# Patient Record
Sex: FEMALE | URBAN - METROPOLITAN AREA
[De-identification: names, ages, dates, MRNs, and addresses within clinical notes are randomized per-mention and may not be internally consistent; named-entity substitution may affect disease eponyms.]

---

## 2018-10-18 ENCOUNTER — APPOINTMENT (RX ONLY)
Dept: URBAN - METROPOLITAN AREA CLINIC 100 | Facility: CLINIC | Age: 68
Setting detail: DERMATOLOGY
End: 2018-10-18

## 2018-10-18 DIAGNOSIS — L20.89 OTHER ATOPIC DERMATITIS: ICD-10-CM

## 2018-10-18 PROBLEM — L20.84 INTRINSIC (ALLERGIC) ECZEMA: Status: ACTIVE | Noted: 2018-10-18

## 2018-10-18 PROCEDURE — ? PRESCRIPTION

## 2018-10-18 PROCEDURE — ? PATIENT SPECIFIC COUNSELING

## 2018-10-18 PROCEDURE — 99212 OFFICE O/P EST SF 10 MIN: CPT

## 2018-10-18 RX ORDER — CLOBETASOL PROPIONATE 0.5 MG/G
CREAM TOPICAL
Qty: 1 | Refills: 1 | Status: ERX | COMMUNITY
Start: 2018-10-18

## 2018-10-18 RX ADMIN — CLOBETASOL PROPIONATE: 0.5 CREAM TOPICAL at 15:27

## 2018-10-18 NOTE — PROCEDURE: PATIENT SPECIFIC COUNSELING
Detail Level: Zone
Discussed patch testing. Literature given.\\nUse baking soda as a deodorant. \\nWash with vanicream cleansing bar.  Continue vanicream moisturizing cream. \\nUse clobetasol cream bid x 2 weeks. \\nDo not use dryer sheets. \\nUse all free and clear laundry detergent.

## 2018-10-22 ENCOUNTER — RX ONLY (OUTPATIENT)
Age: 68
Setting detail: RX ONLY
End: 2018-10-22

## 2018-10-22 RX ORDER — TRIAMCINOLONE ACETONIDE 1 MG/G
CREAM TOPICAL
Qty: 1 | Refills: 0 | Status: ERX | COMMUNITY
Start: 2018-10-22

## 2018-10-26 ENCOUNTER — APPOINTMENT (RX ONLY)
Dept: URBAN - METROPOLITAN AREA CLINIC 100 | Facility: CLINIC | Age: 68
Setting detail: DERMATOLOGY
End: 2018-10-26

## 2018-10-26 DIAGNOSIS — L20.89 OTHER ATOPIC DERMATITIS: ICD-10-CM

## 2018-10-26 PROBLEM — L20.84 INTRINSIC (ALLERGIC) ECZEMA: Status: ACTIVE | Noted: 2018-10-26

## 2018-10-26 PROCEDURE — ? PATIENT SPECIFIC COUNSELING

## 2018-10-26 PROCEDURE — 99212 OFFICE O/P EST SF 10 MIN: CPT

## 2018-10-26 ASSESSMENT — LOCATION ZONE DERM: LOCATION ZONE: TRUNK

## 2018-10-26 ASSESSMENT — LOCATION SIMPLE DESCRIPTION DERM: LOCATION SIMPLE: RIGHT UPPER BACK

## 2018-10-26 ASSESSMENT — LOCATION DETAILED DESCRIPTION DERM: LOCATION DETAILED: RIGHT SUPERIOR UPPER BACK

## 2018-10-26 NOTE — PROCEDURE: PATIENT SPECIFIC COUNSELING
Patient declined having patch test at this time. She stated that she is no longer having the issue and that she thinks baking soda was causing the issue. I called Dr. Callaway and explained the situation and he stated that it was up to her and that we could always do the patch testing if reaction occurs again.
Detail Level: Zone

## 2018-11-16 ENCOUNTER — APPOINTMENT (RX ONLY)
Dept: URBAN - METROPOLITAN AREA CLINIC 100 | Facility: CLINIC | Age: 68
Setting detail: DERMATOLOGY
End: 2018-11-16

## 2018-11-16 DIAGNOSIS — L20.89 OTHER ATOPIC DERMATITIS: ICD-10-CM

## 2019-01-04 ENCOUNTER — APPOINTMENT (RX ONLY)
Dept: URBAN - METROPOLITAN AREA CLINIC 100 | Facility: CLINIC | Age: 69
Setting detail: DERMATOLOGY
End: 2019-01-04

## 2019-01-04 DIAGNOSIS — L20.89 OTHER ATOPIC DERMATITIS: ICD-10-CM

## 2019-01-04 PROBLEM — L20.84 INTRINSIC (ALLERGIC) ECZEMA: Status: ACTIVE | Noted: 2019-01-04

## 2019-01-04 PROCEDURE — 95044 PATCH/APPLICATION TESTS: CPT

## 2019-01-04 PROCEDURE — ? PATCH TESTING

## 2019-01-04 ASSESSMENT — LOCATION SIMPLE DESCRIPTION DERM: LOCATION SIMPLE: RIGHT UPPER BACK

## 2019-01-04 ASSESSMENT — LOCATION DETAILED DESCRIPTION DERM: LOCATION DETAILED: RIGHT MID-UPPER BACK

## 2019-01-04 ASSESSMENT — LOCATION ZONE DERM: LOCATION ZONE: TRUNK

## 2019-01-04 NOTE — PROCEDURE: PATCH TESTING
Number Of Patches (Maximum Allowable Per Dos By Cms Is 90): 80
Consent: Written consent obtained, risks reviewed including but not limited to rash, itching, allergic reaction, systemic rash, remote possiblity of anaphylaxis to allergen.
Post-Care Instructions: I reviewed with the patient in detail post-care instructions. Patient should not sweat, pick at, or get the patches wet for 48 hours.
Detail Level: None

## 2019-01-07 ENCOUNTER — APPOINTMENT (RX ONLY)
Dept: URBAN - METROPOLITAN AREA CLINIC 100 | Facility: CLINIC | Age: 69
Setting detail: DERMATOLOGY
End: 2019-01-07

## 2019-01-07 DIAGNOSIS — L20.89 OTHER ATOPIC DERMATITIS: ICD-10-CM

## 2019-01-07 PROBLEM — L20.84 INTRINSIC (ALLERGIC) ECZEMA: Status: ACTIVE | Noted: 2019-01-07

## 2019-01-07 PROCEDURE — ? CORE ACDS PATCH TEST READING

## 2019-01-07 PROCEDURE — 99212 OFFICE O/P EST SF 10 MIN: CPT

## 2019-01-07 ASSESSMENT — LOCATION SIMPLE DESCRIPTION DERM: LOCATION SIMPLE: UPPER BACK

## 2019-01-07 ASSESSMENT — LOCATION DETAILED DESCRIPTION DERM: LOCATION DETAILED: INFERIOR THORACIC SPINE

## 2019-01-07 ASSESSMENT — LOCATION ZONE DERM: LOCATION ZONE: TRUNK

## 2019-01-07 NOTE — PROCEDURE: CORE ACDS PATCH TEST READING
Allergen 63 Reaction: no reaction
Name Of Allergen 6: Fragrance Mix
Name Of Allergen 20: p-phenylenediamine
Name Of Allergen 39: Polymyxin b sulfate
Name Of Allergen 64: [place] odorate
Name Of Allergen 50: Ethyl acrylate
Show Negative Results In The Note?: Yes
Name Of Allergen 32: 2-mercaptobenzothiazole
Name Of Allergen 71: Triamcinolone ace tonite
Name Of Allergen 11: Ethylenediamine Dinydrochloride
Name Of Allergen 25: Diazolidinyl urea
Name Of Allergen 44: Oleamidopropyl dimethylamine
Name Of Allergen 78: Butylhydrotoluene
Name Of Allergen 59: 4-chloro-3-cresol
Name Of Allergen 55: 2-hydroxy-4-methoxybenzophenone
Allergen 1 Counseling: Patch test negative. \\nPatient was advised to call our office if she develops any delayed reactions up to 72 hours.
Name Of Allergen 4: Potassium Dichromate
Name Of Allergen 18: quaternium 15
Name Of Allergen 37: Propylene glycol
Name Of Allergen 66: ethyleneurea, melamine formaldehyde mix
Name Of Allergen 48: Cinnamic aldehyde
Name Of Allergen 16: Black rubber Mix
Name Of Allergen 3: Neomycin
Name Of Allergen 36: Lidocaine HCL
Name Of Allergen 67: Sorbitan sesquioleate
Name Of Allergen 47: Lavandula augustifolia oil (lavender oil)
Name Of Allergen 15: Carba Mix
Name Of Allergen 74: Ethyl cyanoacrylate
Name Of Allergen 29: Imidazolidinyl Urea
Name Of Allergen 8: Paraben Mix (B)
Name Of Allergen 22: Mercapto Mix (A)
Name Of Allergen 41: Mixed dialkyl thioureas
Name Of Allergen 62: Sodium benzoate
Name Of Allergen 52: Chlorhexidine Digluconte
Name Of Allergen 69: Cetylstearylalcohol
Name Of Allergen 13: p-test-butylphenol formaldehyde resin
Name Of Allergen 27: Tixocortol-21-pivalate
Name Of Allergen 57: Sesquiterpenelactone Mix
Name Of Allergen 76: Disperse orange-3
Allergen 6 Reaction: 2+
Name Of Allergen 33: Bacitracin
Name Of Allergen 70: Ethyl hexyl glycerol
Name Of Allergen 12: Cobalt (II) chloride hexahydrate
Number Of Patches Read: 80
Name Of Allergen 26: Benzocaine
Name Of Allergen 45: Decal glycoside
Name Of Allergen 77: Benzoic acid
Name Of Allergen 58: coconut diethanolamide
Name Of Allergen 56: Tosylamide formaldehyde resin
Name Of Allergen 5: DMDM Hydration
Name Of Allergen 19: methyldibromo glutaronitrile
Name Of Allergen 38: Iodopropynyl butylcarbamate
Name Of Allergen 65: Compositae mix
Name Of Allergen 49: Di alpha tocopherol
Name Of Allergen 17: methylchloroisothiazolinone/methylisothiazolinone
Name Of Allergen 31: hydrocortisone-17-butyrate
Name Of Allergen 72: Clobetasol-17-propionate
Name Of Allergen 10: balsam of [place]
Name Of Allergen 1: Nickel sulfate
Name Of Allergen 24: Thiuram Mix (A)
Name Of Allergen 43: 2-hydroxyethyl-methacrylate
Name Of Allergen 60: Benzalkonium chloride
Name Of Allergen 79: 2-ethylhexyl-4-methoxycinnamate
Name Of Allergen 54: 4-Chloro-3,5-xylenol
Name Of Allergen 30: Budesonide
Name Of Allergen 73: Amidoamine
Name Of Allergen 9: Methylisothiazolinone
Name Of Allergen 2: Amerchol L101
Name Of Allergen 23: 2-broom-2-nitropropane-1,3-Dial
Name Of Allergen 42: Dimethylaminopropylamine
Name Of Allergen 61: Benzophenone-4
Name Of Allergen 80: Benzyl alcohol
Name Of Allergen 53: Propolis
Name Of Allergen 35: Disperse blue mix
Name Of Allergen 68: 1,3-diphenylguanidine
Name Of Allergen 46: Methyl methacrylate
Name Of Allergen 14: bisphenol A epoxy resin
Allergen 34 Reaction: 1+
Name Of Allergen 28: Gold sodium thiosulfate
Name Of Allergen 75: Phenoxyethanol
Name Of Allergen 7: Colophony
Detail Level: Zone
Name Of Allergen 21: Formaldehyde
Name Of Allergen 40: Cocamidopropyl betaine
Name Of Allergen 63: Sorbic acid
Name Of Allergen 51: Tea Tree Oil
What Reading Time Point?: 72 hour

## 2022-08-12 NOTE — HPI: RASH
What Type Of Note Output Would You Prefer (Optional)?: Standard Output
How Severe Is Your Rash?: mild
Is This A New Presentation, Or A Follow-Up?: Rash
ambulatory
non-distended/non-tender